# Patient Record
(demographics unavailable — no encounter records)

---

## 2025-06-09 NOTE — HISTORY OF PRESENT ILLNESS
[de-identified] : 06/09/2025: Christo is a 39 year old M, right hand dominant who presents today complaining of left shoulder Date of Injury/Onset:  no injury Pain:    At Rest: 0/10 With Activity:  2/10 Mechanism of injury:  1 mos. of gradual shoulder pain.  This is [not] a Work Related Injury being treated under Worker's Compensation. This is [not] an athletic injury occurring associated with an interscholastic or organized sports team. Quality of symptoms: dull/ache, stiffness when sleeping Improves with: rest Worse with: bearing weight, working out Prior treatment: None Prior imaging: None Previous injury: [None] Out of work/sport: [Yes], since [date of injury] N/A School/Sport/Position/Occupation:  Additional Information: [None]

## 2025-06-09 NOTE — HISTORY OF PRESENT ILLNESS
[de-identified] : 06/09/2025: Christo is a 39 year old M, right hand dominant who presents today complaining of left shoulder Date of Injury/Onset:  no injury Pain:    At Rest: 0/10 With Activity:  2/10 Mechanism of injury:  1 mos. of gradual shoulder pain.  This is [not] a Work Related Injury being treated under Worker's Compensation. This is [not] an athletic injury occurring associated with an interscholastic or organized sports team. Quality of symptoms: dull/ache, stiffness when sleeping Improves with: rest Worse with: bearing weight, working out Prior treatment: None Prior imaging: None Previous injury: [None] Out of work/sport: [Yes], since [date of injury] N/A School/Sport/Position/Occupation:  Additional Information: [None]